# Patient Record
Sex: MALE | URBAN - METROPOLITAN AREA
[De-identification: names, ages, dates, MRNs, and addresses within clinical notes are randomized per-mention and may not be internally consistent; named-entity substitution may affect disease eponyms.]

---

## 2019-06-28 NOTE — PATIENT DISCUSSION
pt noticing more glare when driving.  ed early symptoms of lens changes.  No need for Sx yet but ed should try polarized Rx sunglasses to cut this glare.

## 2022-01-28 ENCOUNTER — IMPORTED ENCOUNTER (OUTPATIENT)
Dept: URBAN - METROPOLITAN AREA CLINIC 1 | Facility: CLINIC | Age: 32
End: 2022-01-28

## 2022-01-28 PROCEDURE — 92015 DETERMINE REFRACTIVE STATE: CPT

## 2022-01-28 PROCEDURE — 92004 COMPRE OPH EXAM NEW PT 1/>: CPT

## 2022-01-28 NOTE — PATIENT DISCUSSION
1. Myopia - Rx was given for correction if indicated and requested. 2. Alfonso done today shows high cyl OU. Return for an appointment in 1 year for [address] Nora De La Vega.

## 2022-02-02 PROBLEM — H52.13: Noted: 2022-02-02

## 2022-04-02 ASSESSMENT — TONOMETRY
OD_IOP_MMHG: 13
OS_IOP_MMHG: 13

## 2022-04-02 ASSESSMENT — VISUAL ACUITY
OD_CC: 20/25-2
OD_SC: J1+
OS_CC: 20/30-1
OS_SC: J1+

## 2022-12-12 NOTE — PATIENT DISCUSSION
12/12/2022:  Reviewed natural history.  OS fundus appears myopic with PPA.  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